# Patient Record
Sex: FEMALE | Race: WHITE
[De-identification: names, ages, dates, MRNs, and addresses within clinical notes are randomized per-mention and may not be internally consistent; named-entity substitution may affect disease eponyms.]

---

## 2018-11-26 ENCOUNTER — HOSPITAL ENCOUNTER (EMERGENCY)
Dept: HOSPITAL 47 - EC | Age: 23
Discharge: HOME | End: 2018-11-26
Payer: COMMERCIAL

## 2018-11-26 VITALS — RESPIRATION RATE: 18 BRPM

## 2018-11-26 VITALS — TEMPERATURE: 99 F | SYSTOLIC BLOOD PRESSURE: 117 MMHG | DIASTOLIC BLOOD PRESSURE: 62 MMHG | HEART RATE: 87 BPM

## 2018-11-26 DIAGNOSIS — R05: ICD-10-CM

## 2018-11-26 DIAGNOSIS — F17.200: ICD-10-CM

## 2018-11-26 DIAGNOSIS — Z32.02: ICD-10-CM

## 2018-11-26 DIAGNOSIS — R07.0: ICD-10-CM

## 2018-11-26 DIAGNOSIS — R11.10: Primary | ICD-10-CM

## 2018-11-26 DIAGNOSIS — R10.11: ICD-10-CM

## 2018-11-26 DIAGNOSIS — R09.89: ICD-10-CM

## 2018-11-26 LAB
PH UR: 7 [PH] (ref 5–8)
SP GR UR: 1.02 (ref 1–1.03)
UROBILINOGEN UR QL STRIP: <2 MG/DL (ref ?–2)

## 2018-11-26 PROCEDURE — 81003 URINALYSIS AUTO W/O SCOPE: CPT

## 2018-11-26 PROCEDURE — 87430 STREP A AG IA: CPT

## 2018-11-26 PROCEDURE — 87081 CULTURE SCREEN ONLY: CPT

## 2018-11-26 PROCEDURE — 99284 EMERGENCY DEPT VISIT MOD MDM: CPT

## 2018-11-26 PROCEDURE — 87502 INFLUENZA DNA AMP PROBE: CPT

## 2018-11-26 PROCEDURE — 81025 URINE PREGNANCY TEST: CPT

## 2018-11-26 PROCEDURE — 71046 X-RAY EXAM CHEST 2 VIEWS: CPT

## 2018-11-26 NOTE — ED
General Adult HPI





- General


Chief complaint: Nausea/Vomiting/Diarrhea


Stated complaint: Vomiting/cough


Time Seen by Provider: 11/26/18 16:14


Source: patient


Mode of arrival: ambulatory


Limitations: no limitations





- History of Present Illness


Initial comments: 


23-year-old female with no past medical history presenting today for multiple 

complaints. Pt state that she had one episode of vomiting this morning 

randomly. She states she did have a mild stomach ache (RUQ) for the past day or 

so. Pt denies fever, chills, hematemesis, melena, hematochezia. Bowel movement 

normal, pt denies constipation. Patient did say she was concerned for pregnancy 

because she is on protective sex.  In addition patient states she has had cough 

for the past 3 months, denies it being productive.   She states this she states 

that she often gets cough and congestion with the change of seasons.  Patient 

denies sinus pressure.  Patient denies any chest pain, shortness of breath or 

dyspnea on exertion. Pt states she missed work this morning and is just here 

for a work note, she states she does not want blood drawn. Remainder of ROS (-)

, patient denies any recent back pain, abdominal pain, numbness or tingling, 

dysuria or hematuria, constipation or diarrhea, headaches or visual changes, or 

any other complaints. Upon arrival VS stable pt is well appearing, eating candy 

in room.








- Related Data


 Previous Rx's











 Medication  Instructions  Recorded


 


Loratadine [Claritin] 10 mg PO DAILY 7 Days #7 tab 11/26/18











 Allergies











Allergy/AdvReac Type Severity Reaction Status Date / Time


 


No Known Allergies Allergy   Verified 11/26/18 15:50














Review of Systems


ROS Statement: 


Those systems with pertinent positive or pertinent negative responses have been 

documented in the HPI.





ROS Other: All systems not noted in ROS Statement are negative.


Constitutional: Denies: fever, chills, night sweats


ENT: Reports: throat pain (pt admits to throat burn after vomiting, no blood).  

Denies: ear pain


Respiratory: Denies: cough, wheezes, hemoptysis, stridor


Cardiovascular: Denies: chest pain, palpitations, dyspnea on exertion


Endocrine: Denies: fatigue


Gastrointestinal: Reports: abdominal pain, nausea, vomiting.  Denies: diarrhea, 

constipation, hematemesis, melena, hematochezia


Genitourinary: Denies: urgency, dysuria, frequency, hematuria, discharge


Musculoskeletal: Denies: back pain


Skin: Denies: rash, lesions


Neurological: Denies: headache, weakness, numbness, paresthesias, confusion





Past Medical History


Past Medical History: No Reported History


History of Any Multi-Drug Resistant Organisms: None Reported


Past Surgical History: No Surgical Hx Reported


Past Psychological History: No Psychological Hx Reported


Smoking Status: Current every day smoker


Past Alcohol Use History: None Reported


Past Drug Use History: None Reported





General Exam





- General Exam Comments


Initial Comments: 


General:  The patient is awake and alert, in no distress, and does not appear 

acutely ill. 


Eye:  Pupils are equal, round and reactive to light, extra-ocular movements are 

intact.  No nystagmus.  There is normal conjunctiva bilaterally.  No signs of 

icterus.  


Ears, nose, mouth and throat:  There are moist mucous membranes and no oral 

lesions.  Oropharynx is nonerythematous, there is no tonsillar enlargement or 

exudates.  No anterior cervical lymph node feet.  No pain to palpation of the 

frontal and maxillary sinuses.  Tympanic membranes within normal limits 

bilaterally. 


Neck:  The neck is supple, there is no tenderness or JVD.  


Cardiovascular:  There is a regular rate and rhythm. No murmur, rub or gallop 

is appreciated.


Respiratory:  Lungs are clear to auscultation, respirations are non-labored, 

breath sounds are equal.  No wheezes, stridor, rales, or rhonchi.


Gastrointestinal:  No noted diaphoresis, jaundice, pallor, protecting postures 

or squirming.


Symmetrical pigmentation of abdomen without signs of inflammation, scars, or 

striae. Umbilicus mildline, inverted without swelling. No dilated veins. No 

noted abdominal distention, no rigidity or guarding. No visible masses. No 

peristalsis, aortic pulsations, or ventral hernia. Bowel sounds audible in all 

4 quadrants, unremarkable.  No friction rubs or venous hums. No epigastic, 

hepatic or abdominal bruits. No tenderness to light or deep palpation. Liver 

edge, not palpable. Spleen edge, right and left kidney not palpable. Superior 

bladder margin non-tender. 


Special Testing:


Negative  [place], Rovsing, McBurney, [place], cutaneous hyperesthesia. 

Iliopsoas and obturator tests negative bilaterally. Negative Heel Jar test. No 

CVA tenderness. Digital rectal exam deferred. Negative grey turners or cullens 

sign


Musculoskeletal:  Normal ROM, no tenderness.  Strength 5/5. Sensation intact. 

Pulses equal bilaterally 2+.  


Neurological:  A&O x 3. CN II-XII intact, There are no obvious motor or sensory 

deficits. Coordination appears grossly intact. Speech is normal.


Skin:  Skin is warm and dry and no rashes or lesions are noted. 


Psychiatric:  Cooperative, appropriate mood & affect, normal judgment.  





Limitations: no limitations





Course


 Vital Signs











  11/26/18





  15:46


 


Temperature 98.5 F


 


Pulse Rate 77


 


Respiratory 18





Rate 


 


Blood Pressure 95/67


 


O2 Sat by Pulse 99





Oximetry 














Medical Decision Making





- Medical Decision Making


Vital signs within acceptable limits.  Strep testing negative, chest x-ray 

negative.  UA unremarkable.  HCG negative.  Patient denied medication stating 

she only needs work note.  Patient denies blood draw.  Abdominal exam benign, 

no suspicion for acute abdomen at this time. Tolerating PO intake in room. 

Patient did admit to a burning sensation throat, denies any shortness of breath

, feeling of throat closing, swelling of the neck.  I feel this may be 

consistent with recent emesis. At this time I feel pt is stable for discharge 

with short return parameters for any worsening abdominal pain.  Pt states she 

has chronic allergies, I started patient on claritin. Patient verbalized 

agreement with plan.  I discussed the case with Dr. Ferrara who agreed with 

impression and plan. Pt discharged in stable condition. Pt agreeable with 

discharge stating, " she just needs a work note". 








- Lab Data


 Lab Results











  11/26/18 11/26/18 11/26/18 Range/Units





  16:20 16:20 16:50 


 


Urine Color  Yellow    


 


Urine Appearance  Clear    (Clear)  


 


Urine pH  7.0    (5.0-8.0)  


 


Ur Specific Gravity  1.019    (1.001-1.035)  


 


Urine Protein  Negative    (Negative)  


 


Urine Glucose (UA)  Negative    (Negative)  


 


Urine Ketones  Negative    (Negative)  


 


Urine Blood  Negative    (Negative)  


 


Urine Nitrite  Negative    (Negative)  


 


Urine Bilirubin  Negative    (Negative)  


 


Urine Urobilinogen  <2.0    (<2.0)  mg/dL


 


Ur Leukocyte Esterase  Negative    (Negative)  


 


Urine HCG, Qual   Not Detected   (Not Detectd)  


 


Influenza Type A RNA    Not Detected  (Not Detectd)  


 


Influenza Type B (PCR)    Not Detected  (Not Detectd)  


 


Group A Strep Rapid     (Negative)  














  11/26/18 Range/Units





  16:50 


 


Urine Color   


 


Urine Appearance   (Clear)  


 


Urine pH   (5.0-8.0)  


 


Ur Specific Gravity   (1.001-1.035)  


 


Urine Protein   (Negative)  


 


Urine Glucose (UA)   (Negative)  


 


Urine Ketones   (Negative)  


 


Urine Blood   (Negative)  


 


Urine Nitrite   (Negative)  


 


Urine Bilirubin   (Negative)  


 


Urine Urobilinogen   (<2.0)  mg/dL


 


Ur Leukocyte Esterase   (Negative)  


 


Urine HCG, Qual   (Not Detectd)  


 


Influenza Type A RNA   (Not Detectd)  


 


Influenza Type B (PCR)   (Not Detectd)  


 


Group A Strep Rapid  Negative  (Negative)  














Disposition


Clinical Impression: 


 Vomiting, Chronic cough





Disposition: HOME SELF-CARE


Condition: Good


Instructions:  Acute Nausea and Vomiting (ED)


Additional Instructions: 


Please use medication as discussed.  Please follow-up with family doctor in the 

next 2 days..  Please return to emergency room if the symptoms increase or 

worsen or for any other concerns, as discussed.


Prescriptions: 


Loratadine [Claritin] 10 mg PO DAILY 7 Days #7 tab


Is patient prescribed a controlled substance at d/c from ED?: No


Referrals: 


Anton Chavez MD [Primary Care Provider] - 1-2 days


Time of Disposition: 18:41

## 2018-11-26 NOTE — XR
EXAMINATION TYPE: XR chest 2V

 

DATE OF EXAM: 11/26/2018

 

COMPARISON: NONE

 

HISTORY: Cough and sore throat

 

TECHNIQUE:  Frontal and lateral views of the chest are obtained.

 

FINDINGS:  Heart and mediastinum are normal. Lungs are clear. Diaphragm is normal. Bony thorax appear
s normal. Pulmonary vascularity is normal.

 

IMPRESSION:  Normal chest.

## 2019-01-01 ENCOUNTER — HOSPITAL ENCOUNTER (EMERGENCY)
Dept: HOSPITAL 47 - EC | Age: 24
Discharge: HOME | End: 2019-01-01
Payer: COMMERCIAL

## 2019-01-01 VITALS — RESPIRATION RATE: 16 BRPM | DIASTOLIC BLOOD PRESSURE: 63 MMHG | SYSTOLIC BLOOD PRESSURE: 108 MMHG | HEART RATE: 73 BPM

## 2019-01-01 VITALS — TEMPERATURE: 98.3 F

## 2019-01-01 DIAGNOSIS — Z3A.08: ICD-10-CM

## 2019-01-01 DIAGNOSIS — O21.9: Primary | ICD-10-CM

## 2019-01-01 DIAGNOSIS — O23.41: ICD-10-CM

## 2019-01-01 DIAGNOSIS — R10.31: ICD-10-CM

## 2019-01-01 DIAGNOSIS — F17.200: ICD-10-CM

## 2019-01-01 DIAGNOSIS — O99.89: ICD-10-CM

## 2019-01-01 DIAGNOSIS — O99.331: ICD-10-CM

## 2019-01-01 LAB
ALBUMIN SERPL-MCNC: 4.7 G/DL (ref 3.5–5)
ALP SERPL-CCNC: 53 U/L (ref 38–126)
ALT SERPL-CCNC: 76 U/L (ref 9–52)
AMYLASE SERPL-CCNC: 56 U/L (ref 30–110)
ANION GAP SERPL CALC-SCNC: 13 MMOL/L
AST SERPL-CCNC: 52 U/L (ref 14–36)
BASOPHILS # BLD AUTO: 0 K/UL (ref 0–0.2)
BASOPHILS NFR BLD AUTO: 0 %
BUN SERPL-SCNC: 13 MG/DL (ref 7–17)
CALCIUM SPEC-MCNC: 9.9 MG/DL (ref 8.4–10.2)
CHLORIDE SERPL-SCNC: 102 MMOL/L (ref 98–107)
CO2 SERPL-SCNC: 24 MMOL/L (ref 22–30)
EOSINOPHIL # BLD AUTO: 0.1 K/UL (ref 0–0.7)
EOSINOPHIL NFR BLD AUTO: 1 %
ERYTHROCYTE [DISTWIDTH] IN BLOOD BY AUTOMATED COUNT: 5.16 M/UL (ref 3.8–5.4)
ERYTHROCYTE [DISTWIDTH] IN BLOOD: 12.5 % (ref 11.5–15.5)
GLUCOSE SERPL-MCNC: 90 MG/DL (ref 74–99)
HCT VFR BLD AUTO: 44.2 % (ref 34–46)
HGB BLD-MCNC: 15 GM/DL (ref 11.4–16)
LIPASE SERPL-CCNC: 101 U/L (ref 23–300)
LYMPHOCYTES # SPEC AUTO: 1.8 K/UL (ref 1–4.8)
LYMPHOCYTES NFR SPEC AUTO: 18 %
MCH RBC QN AUTO: 29.1 PG (ref 25–35)
MCHC RBC AUTO-ENTMCNC: 34 G/DL (ref 31–37)
MCV RBC AUTO: 85.6 FL (ref 80–100)
MONOCYTES # BLD AUTO: 0.6 K/UL (ref 0–1)
MONOCYTES NFR BLD AUTO: 5 %
NEUTROPHILS # BLD AUTO: 7.7 K/UL (ref 1.3–7.7)
NEUTROPHILS NFR BLD AUTO: 74 %
PH UR: 6.5 [PH] (ref 5–8)
PLATELET # BLD AUTO: 206 K/UL (ref 150–450)
POTASSIUM SERPL-SCNC: 4 MMOL/L (ref 3.5–5.1)
PROT SERPL-MCNC: 7.8 G/DL (ref 6.3–8.2)
PROT UR QL: (no result)
RBC UR QL: 18 /HPF (ref 0–5)
SODIUM SERPL-SCNC: 139 MMOL/L (ref 137–145)
SP GR UR: 1.03 (ref 1–1.03)
SQUAMOUS UR QL AUTO: 85 /HPF (ref 0–4)
UROBILINOGEN UR QL STRIP: 6 MG/DL (ref ?–2)
WBC # BLD AUTO: 10.3 K/UL (ref 3.8–10.6)

## 2019-01-01 PROCEDURE — 96375 TX/PRO/DX INJ NEW DRUG ADDON: CPT

## 2019-01-01 PROCEDURE — 80053 COMPREHEN METABOLIC PANEL: CPT

## 2019-01-01 PROCEDURE — 86900 BLOOD TYPING SEROLOGIC ABO: CPT

## 2019-01-01 PROCEDURE — 87086 URINE CULTURE/COLONY COUNT: CPT

## 2019-01-01 PROCEDURE — 36415 COLL VENOUS BLD VENIPUNCTURE: CPT

## 2019-01-01 PROCEDURE — 84702 CHORIONIC GONADOTROPIN TEST: CPT

## 2019-01-01 PROCEDURE — 86901 BLOOD TYPING SEROLOGIC RH(D): CPT

## 2019-01-01 PROCEDURE — 76801 OB US < 14 WKS SINGLE FETUS: CPT

## 2019-01-01 PROCEDURE — 96361 HYDRATE IV INFUSION ADD-ON: CPT

## 2019-01-01 PROCEDURE — 99284 EMERGENCY DEPT VISIT MOD MDM: CPT

## 2019-01-01 PROCEDURE — 83690 ASSAY OF LIPASE: CPT

## 2019-01-01 PROCEDURE — 81001 URINALYSIS AUTO W/SCOPE: CPT

## 2019-01-01 PROCEDURE — 96374 THER/PROPH/DIAG INJ IV PUSH: CPT

## 2019-01-01 PROCEDURE — 85025 COMPLETE CBC W/AUTO DIFF WBC: CPT

## 2019-01-01 PROCEDURE — 82150 ASSAY OF AMYLASE: CPT

## 2019-01-01 PROCEDURE — 76705 ECHO EXAM OF ABDOMEN: CPT

## 2019-01-01 NOTE — ED
Nausea/Vomiting/Diarrhea HPI





- General


Chief complaint: Nausea/Vomiting/Diarrhea


Stated complaint: 8wks pregnant/not able to keep anything down


Time Seen by Provider: 01/01/19 18:03


Source: patient, RN notes reviewed, old records reviewed


Mode of arrival: ambulatory


Limitations: no limitations





- History of Present Illness


Initial comments: 


Exactly Patient is a 23-year-old female, 8 weeks pregnant, presents emergency 

department today with chief complaint of nausea.  Patient states that she's 

been having significant nausea since she are not she was pregnant a few weeks 

ago.  She had an ultrasound 2 weeks ago to confirm intrauterine pregnancy.  

Patient states that she's had no vaginal bleeding or discharge.  Today she 

complains of some mild right lower quadrant pain.  Patient states that she's 

been having this right lower quadrant pain for over a week.  She states that 

she isn't having a difficult time keeping anything down.  After one bite of 

food she throws up.  She denies any chest pain, shortness of breath.  This is 

patient's first pregnancy.  Patient states she has not had chance to follow-up 

with OB/GYN as of this time.








- Related Data


 Previous Rx's











 Medication  Instructions  Recorded


 


Cephalexin [Keflex] 500 mg PO Q8HR #21 cap 01/01/19


 


Metoclopramide [Reglan] 10 mg PO TID #12 tab 01/01/19











 Allergies











Allergy/AdvReac Type Severity Reaction Status Date / Time


 


No Known Allergies Allergy   Verified 01/01/19 18:13














Review of Systems


ROS Statement: 


Those systems with pertinent positive or pertinent negative responses have been 

documented in the HPI.





ROS Other: All systems not noted in ROS Statement are negative.





Past Medical History


Past Medical History: No Reported History


History of Any Multi-Drug Resistant Organisms: None Reported


Past Surgical History: No Surgical Hx Reported


Past Psychological History: No Psychological Hx Reported


Smoking Status: Current every day smoker


Past Alcohol Use History: None Reported


Past Drug Use History: None Reported





General Exam





- General Exam Comments


Initial Comments: 





This is a 23-year-old female.  Alert and oriented.  Patient appears in acute 

distress.


Limitations: no limitations


General appearance: alert, in no apparent distress


Head exam: Present: atraumatic, normocephalic, normal inspection


Eye exam: Present: normal appearance, PERRL, EOMI.  Absent: scleral icterus, 

conjunctival injection, periorbital swelling


ENT exam: Present: normal exam, mucous membranes moist


Neck exam: Present: normal inspection.  Absent: tenderness, meningismus, 

lymphadenopathy


Respiratory exam: Present: normal lung sounds bilaterally.  Absent: respiratory 

distress, wheezes, rales, rhonchi, stridor


Cardiovascular Exam: Present: regular rate, normal rhythm, normal heart sounds.

  Absent: systolic murmur, diastolic murmur, rubs, gallop, clicks


GI/Abdominal exam: Present: soft, tenderness (minimal RLQ tenderness), normal 

bowel sounds.  Absent: distended, guarding, rebound, rigid


Extremities exam: Present: normal inspection, full ROM, normal capillary 

refill.  Absent: tenderness, pedal edema, joint swelling, calf tenderness


Back exam: Present: normal inspection


Neurological exam: Present: alert, oriented X3, CN II-XII intact


Psychiatric exam: Present: normal affect, normal mood


Skin exam: Present: warm, dry, intact, normal color.  Absent: rash





Course


 Vital Signs











  01/01/19





  17:53


 


Temperature 98.3 F


 


Pulse Rate 108 H


 


Respiratory 20





Rate 


 


Blood Pressure 117/71


 


O2 Sat by Pulse 100





Oximetry 














Medical Decision Making





- Medical Decision Making





23-year-old female presents emergency department today with concerns of nausea 

during initial pregnancy.  She also complains of right-sided abdominal pain.  

No significant tenderness.  Patient is given IV fluids labwork obtained.  Given 

Reglan and Benadryl.  She has had improvement of her nausea symptoms at this 

time.  Patient at this time has a normal white count.  Normal vital signs.  

Urinalysis is positive for signs of infection.  She adamantly denies vaginal 

bleeding did not declines doing a pelvic exam.  She is Rh+ blood type.  

Cirrhosis I will put the Patient on antibiotics to cover for urinary tract 

infection.  Discussed close follow-up with OB/GYN.  She does not have an OB/GYN 

at this time.  Given referrals started to establish care. 





- Lab Data


Result diagrams: 


 01/01/19 18:47





 01/01/19 18:47


 Lab Results











  01/01/19 01/01/19 01/01/19 Range/Units





  18:35 18:47 18:47 


 


WBC     (3.8-10.6)  k/uL


 


RBC     (3.80-5.40)  m/uL


 


Hgb     (11.4-16.0)  gm/dL


 


Hct     (34.0-46.0)  %


 


MCV     (80.0-100.0)  fL


 


MCH     (25.0-35.0)  pg


 


MCHC     (31.0-37.0)  g/dL


 


RDW     (11.5-15.5)  %


 


Plt Count     (150-450)  k/uL


 


Neutrophils %     %


 


Lymphocytes %     %


 


Monocytes %     %


 


Eosinophils %     %


 


Basophils %     %


 


Neutrophils #     (1.3-7.7)  k/uL


 


Lymphocytes #     (1.0-4.8)  k/uL


 


Monocytes #     (0-1.0)  k/uL


 


Eosinophils #     (0-0.7)  k/uL


 


Basophils #     (0-0.2)  k/uL


 


Sodium    139  (137-145)  mmol/L


 


Potassium    4.0  (3.5-5.1)  mmol/L


 


Chloride    102  ()  mmol/L


 


Carbon Dioxide    24  (22-30)  mmol/L


 


Anion Gap    13  mmol/L


 


BUN    13  (7-17)  mg/dL


 


Creatinine    0.56  (0.52-1.04)  mg/dL


 


Est GFR (CKD-EPI)AfAm    >90  (>60 ml/min/1.73 sqM)  


 


Est GFR (CKD-EPI)NonAf    >90  (>60 ml/min/1.73 sqM)  


 


Glucose    90  (74-99)  mg/dL


 


Calcium    9.9  (8.4-10.2)  mg/dL


 


Total Bilirubin    0.4  (0.2-1.3)  mg/dL


 


AST    52 H  (14-36)  U/L


 


ALT    76 H  (9-52)  U/L


 


Alkaline Phosphatase    53  ()  U/L


 


Total Protein    7.8  (6.3-8.2)  g/dL


 


Albumin    4.7  (3.5-5.0)  g/dL


 


Amylase    56  ()  U/L


 


Lipase    101  ()  U/L


 


Urine Color  Yellow    


 


Urine Appearance  Turbid H    (Clear)  


 


Urine pH  6.5    (5.0-8.0)  


 


Ur Specific Gravity  1.027    (1.001-1.035)  


 


Urine Protein  1+ H    (Negative)  


 


Urine Glucose (UA)  Trace H    (Negative)  


 


Urine Ketones  Trace H    (Negative)  


 


Urine Blood  Negative    (Negative)  


 


Urine Nitrite  Negative    (Negative)  


 


Urine Bilirubin  Negative    (Negative)  


 


Urine Urobilinogen  6.0    (<2.0)  mg/dL


 


Ur Leukocyte Esterase  Large H    (Negative)  


 


Urine RBC  18 H    (0-5)  /hpf


 


Urine WBC  9 H    (0-5)  /hpf


 


Ur Squamous Epith Cells  85 H    (0-4)  /hpf


 


Urine Bacteria  Moderate H    (None)  /hpf


 


Urine Mucus  Many H    (None)  /hpf


 


Blood Type   O Positive   


 


Blood Type Recheck   [facility] ONLY   














  01/01/19 Range/Units





  18:47 


 


WBC  10.3  (3.8-10.6)  k/uL


 


RBC  5.16  (3.80-5.40)  m/uL


 


Hgb  15.0  (11.4-16.0)  gm/dL


 


Hct  44.2  (34.0-46.0)  %


 


MCV  85.6  (80.0-100.0)  fL


 


MCH  29.1  (25.0-35.0)  pg


 


MCHC  34.0  (31.0-37.0)  g/dL


 


RDW  12.5  (11.5-15.5)  %


 


Plt Count  206  (150-450)  k/uL


 


Neutrophils %  74  %


 


Lymphocytes %  18  %


 


Monocytes %  5  %


 


Eosinophils %  1  %


 


Basophils %  0  %


 


Neutrophils #  7.7  (1.3-7.7)  k/uL


 


Lymphocytes #  1.8  (1.0-4.8)  k/uL


 


Monocytes #  0.6  (0-1.0)  k/uL


 


Eosinophils #  0.1  (0-0.7)  k/uL


 


Basophils #  0.0  (0-0.2)  k/uL


 


Sodium   (137-145)  mmol/L


 


Potassium   (3.5-5.1)  mmol/L


 


Chloride   ()  mmol/L


 


Carbon Dioxide   (22-30)  mmol/L


 


Anion Gap   mmol/L


 


BUN   (7-17)  mg/dL


 


Creatinine   (0.52-1.04)  mg/dL


 


Est GFR (CKD-EPI)AfAm   (>60 ml/min/1.73 sqM)  


 


Est GFR (CKD-EPI)NonAf   (>60 ml/min/1.73 sqM)  


 


Glucose   (74-99)  mg/dL


 


Calcium   (8.4-10.2)  mg/dL


 


Total Bilirubin   (0.2-1.3)  mg/dL


 


AST   (14-36)  U/L


 


ALT   (9-52)  U/L


 


Alkaline Phosphatase   ()  U/L


 


Total Protein   (6.3-8.2)  g/dL


 


Albumin   (3.5-5.0)  g/dL


 


Amylase   ()  U/L


 


Lipase   ()  U/L


 


Urine Color   


 


Urine Appearance   (Clear)  


 


Urine pH   (5.0-8.0)  


 


Ur Specific Gravity   (1.001-1.035)  


 


Urine Protein   (Negative)  


 


Urine Glucose (UA)   (Negative)  


 


Urine Ketones   (Negative)  


 


Urine Blood   (Negative)  


 


Urine Nitrite   (Negative)  


 


Urine Bilirubin   (Negative)  


 


Urine Urobilinogen   (<2.0)  mg/dL


 


Ur Leukocyte Esterase   (Negative)  


 


Urine RBC   (0-5)  /hpf


 


Urine WBC   (0-5)  /hpf


 


Ur Squamous Epith Cells   (0-4)  /hpf


 


Urine Bacteria   (None)  /hpf


 


Urine Mucus   (None)  /hpf


 


Blood Type   


 


Blood Type Recheck   














- Radiology Data


Radiology results: report reviewed


Fetal ultrasound shows viable IUP heart rate 1 72 bpm.  Measuring 9 weeks and 0 

days.  Ultrasound of the appendix was negative for any inflammatory changes.





Disposition


Clinical Impression: 


 Nausea/vomiting in pregnancy, UTI in pregnancy





Disposition: HOME SELF-CARE


Condition: Good


Instructions:  Urinary Tract Infection in Pregnancy (ED)


Additional Instructions: 


Patient advised to  follow-up with OB/GYN.  Return to emergency department if 

any alarming signs or symptoms occur.  Take medications as prescribed.  Rest, 

remain hydrated.


Prescriptions: 


Cephalexin [Keflex] 500 mg PO Q8HR #21 cap


Metoclopramide [Reglan] 10 mg PO TID #12 tab


Is patient prescribed a controlled substance at d/c from ED?: No


Referrals: 


Anton Chavez MD [Primary Care Provider] - 1-2 days


Time of Disposition: 21:08

## 2019-01-01 NOTE — US
EXAMINATION TYPE: US abdomen APPY

 

DATE OF EXAM: 1/1/2019

 

COMPARISON: NONE

 

CLINICAL HISTORY: Pain. RLQ Pain

 

APPENDIX

AP Diameter (normal < 6mm):  3 mm

     Measured outer wall to outer wall.

 

Is the appendix seen in its entirety from the proximal cecum to distal end:  Yes 

Is the appendix compressible:  no 

Does the appendix wall appear hypervascular:  no 

Is an appendicolith present:  no 

Is there inflammatory changes or free fluid present:  no 

 

Appendix not seen in its entirety.

 

 

 

IMPRESSION:  Appendix is partly seen and appears normal. No free fluid.

## 2019-01-01 NOTE — US
EXAMINATION TYPE: Transabdominal

 

DATE OF EXAM: 4/3/18

 

COMPARISON: NONE

 

CLINICAL HISTORY: Pain. RLQ pain

 

EXAM PERFORMED:  Transabdominal (TA)

 

EXAM MEASUREMENTS:

 

GESTATIONAL AGE / DATING

 

Physician Established: (8 weeks/4 days) 

** EDC:  08/09/2019

Dates by LMP: (8 weeks/4 days)  

** EDC: 08/09/2019

Dates by First Scan:  No previous this is first scan 

Dates by Current Scan for: (9 weeks/0 days)  

** EDC: 08/06/2019

 

MATERNAL ANATOMY 

 

Uterus: 12.6 x 6.6 x 8.0 cm

Right Ovary: 3.0 x 2.9 x 2.5 cm

Left Ovary: 3.1 x 2.9 x 2.4 cm

Post CDS / Adnexa: wnl

Presence of free fluid: no

Presence of corpus luteal cyst: no

Presence of subchorionic bleed: no

 

GESTATION / FETAL SURVEY

 

CRL: 2.3cm (9 weeks/0 days)

Yolk Sac (normal less than 6mm): 4mm

Heart Rate: 172 bpm

Rhythm:  Normal

IUP:  Viable IUP

 

Beta HcG (if available): Not available at this time

 

Viable IUP 9w 0d  BPM

 

 

 

IMPRESSION:

No complicating process seen.

## 2019-01-11 ENCOUNTER — HOSPITAL ENCOUNTER (EMERGENCY)
Dept: HOSPITAL 47 - EC | Age: 24
Discharge: HOME | End: 2019-01-11
Payer: COMMERCIAL

## 2019-01-11 VITALS — SYSTOLIC BLOOD PRESSURE: 103 MMHG | TEMPERATURE: 98.5 F | HEART RATE: 62 BPM | DIASTOLIC BLOOD PRESSURE: 74 MMHG

## 2019-01-11 VITALS — RESPIRATION RATE: 16 BRPM

## 2019-01-11 DIAGNOSIS — O99.89: ICD-10-CM

## 2019-01-11 DIAGNOSIS — F17.200: ICD-10-CM

## 2019-01-11 DIAGNOSIS — R42: ICD-10-CM

## 2019-01-11 DIAGNOSIS — Z53.8: ICD-10-CM

## 2019-01-11 DIAGNOSIS — O99.331: ICD-10-CM

## 2019-01-11 DIAGNOSIS — Z3A.10: ICD-10-CM

## 2019-01-11 DIAGNOSIS — O21.9: Primary | ICD-10-CM

## 2019-01-11 DIAGNOSIS — R00.0: ICD-10-CM

## 2019-01-11 LAB
ALBUMIN SERPL-MCNC: 5.1 G/DL (ref 3.5–5)
ALP SERPL-CCNC: 43 U/L (ref 38–126)
ALT SERPL-CCNC: 38 U/L (ref 9–52)
AMYLASE SERPL-CCNC: 65 U/L (ref 30–110)
ANION GAP SERPL CALC-SCNC: 13 MMOL/L
AST SERPL-CCNC: 29 U/L (ref 14–36)
BASOPHILS # BLD AUTO: 0 K/UL (ref 0–0.2)
BASOPHILS NFR BLD AUTO: 0 %
BUN SERPL-SCNC: 12 MG/DL (ref 7–17)
CALCIUM SPEC-MCNC: 10.4 MG/DL (ref 8.4–10.2)
CHLORIDE SERPL-SCNC: 103 MMOL/L (ref 98–107)
CO2 SERPL-SCNC: 24 MMOL/L (ref 22–30)
EOSINOPHIL # BLD AUTO: 0.1 K/UL (ref 0–0.7)
EOSINOPHIL NFR BLD AUTO: 1 %
ERYTHROCYTE [DISTWIDTH] IN BLOOD BY AUTOMATED COUNT: 5.2 M/UL (ref 3.8–5.4)
ERYTHROCYTE [DISTWIDTH] IN BLOOD: 12.7 % (ref 11.5–15.5)
GLUCOSE SERPL-MCNC: 121 MG/DL (ref 74–99)
HCT VFR BLD AUTO: 45.1 % (ref 34–46)
HGB BLD-MCNC: 15.1 GM/DL (ref 11.4–16)
KETONES UR QL STRIP.AUTO: (no result)
LIPASE SERPL-CCNC: 87 U/L (ref 23–300)
LYMPHOCYTES # SPEC AUTO: 1.7 K/UL (ref 1–4.8)
LYMPHOCYTES NFR SPEC AUTO: 17 %
MCH RBC QN AUTO: 28.9 PG (ref 25–35)
MCHC RBC AUTO-ENTMCNC: 33.4 G/DL (ref 31–37)
MCV RBC AUTO: 86.7 FL (ref 80–100)
MONOCYTES # BLD AUTO: 0.4 K/UL (ref 0–1)
MONOCYTES NFR BLD AUTO: 4 %
NEUTROPHILS # BLD AUTO: 7.5 K/UL (ref 1.3–7.7)
NEUTROPHILS NFR BLD AUTO: 77 %
PH UR: 5.5 [PH] (ref 5–8)
PLATELET # BLD AUTO: 202 K/UL (ref 150–450)
POTASSIUM SERPL-SCNC: 4 MMOL/L (ref 3.5–5.1)
PROT SERPL-MCNC: 8.4 G/DL (ref 6.3–8.2)
RBC UR QL: 3 /HPF (ref 0–5)
SODIUM SERPL-SCNC: 140 MMOL/L (ref 137–145)
SP GR UR: 1.02 (ref 1–1.03)
SQUAMOUS UR QL AUTO: 13 /HPF (ref 0–4)
UROBILINOGEN UR QL STRIP: 3 MG/DL (ref ?–2)
WBC # BLD AUTO: 9.8 K/UL (ref 3.8–10.6)
WBC #/AREA URNS HPF: 27 /HPF (ref 0–5)

## 2019-01-11 PROCEDURE — 36415 COLL VENOUS BLD VENIPUNCTURE: CPT

## 2019-01-11 PROCEDURE — 80053 COMPREHEN METABOLIC PANEL: CPT

## 2019-01-11 PROCEDURE — 83690 ASSAY OF LIPASE: CPT

## 2019-01-11 PROCEDURE — 82150 ASSAY OF AMYLASE: CPT

## 2019-01-11 PROCEDURE — 85025 COMPLETE CBC W/AUTO DIFF WBC: CPT

## 2019-01-11 PROCEDURE — 96361 HYDRATE IV INFUSION ADD-ON: CPT

## 2019-01-11 PROCEDURE — 96374 THER/PROPH/DIAG INJ IV PUSH: CPT

## 2019-01-11 PROCEDURE — 96375 TX/PRO/DX INJ NEW DRUG ADDON: CPT

## 2019-01-11 PROCEDURE — 99284 EMERGENCY DEPT VISIT MOD MDM: CPT

## 2019-01-11 PROCEDURE — 81001 URINALYSIS AUTO W/SCOPE: CPT

## 2019-01-11 NOTE — ED
Nausea/Vomiting/Diarrhea HPI





- General


Chief complaint: Nausea/Vomiting/Diarrhea


Stated complaint: 10 wks preg/vomiting


Time Seen by Provider: 19 17:21


Source: family


Mode of arrival: ambulatory


Limitations: no limitations





- History of Present Illness


Initial comments: 


23-year-old female patient presents to the emergency department today for 

evaluation of nausea and vomiting.  Patient states she is 10 weeks pregnant and 

has been having consistent nausea and vomiting on a daily basis since the 

beginning of January.  Patient states that she was seen and evaluated here on 

the first, was given Reglan.  States she is unable to keep down the pill.  

Patient states she is barely 1 pill per day and is unable to keep down any food 

or fluids.  Patient states she started to feel she is having racing heart and 

some lightheadedness.  States that her emesis is mostly bilious and frothy.  

She denies any hematemesis.  Denies any diarrhea, hematochezia, or melena.  

Denies any fevers or chills with this.  She is  and has an appointment 

with her OB/GYN Dr. Starkey on .  States that she has had 2 ultrasounds 

confirming intrauterine pregnancy.  She denies any current abdominal pain, 

vaginal bleeding, or vaginal discharge. Patient denies any recent rash, 

shortness breath, chest pain,  back pain, numbness, tingling, dizziness, 

weakness, hematuria, dysuria, urinary urgency, urinary frequency, headache, 

visual changes, or any other complaints.





- Related Data


 Previous Rx's











 Medication  Instructions  Recorded


 


Metoclopramide [Reglan] 10 mg PO TID #12 tab 19


 


Doxylamine/Pyridoxine HCl (B6) 1 tab PO HS #20 tab 19





[Venkatesh Meyers 10-10 mg Tablet]  


 


Famotidine [Pepcid] 20 mg PO DAILY #20 tablet 19


 


Metoclopramide [Reglan] 10 mg PO Q8H PRN #20 tab 19











 Allergies











Allergy/AdvReac Type Severity Reaction Status Date / Time


 


No Known Allergies Allergy   Verified 19 18:10














Review of Systems


ROS Statement: 


Those systems with pertinent positive or pertinent negative responses have been 

documented in the HPI.





ROS Other: All systems not noted in ROS Statement are negative.





Past Medical History


Past Medical History: No Reported History


History of Any Multi-Drug Resistant Organisms: None Reported


Past Surgical History: No Surgical Hx Reported


Past Psychological History: No Psychological Hx Reported


Smoking Status: Current every day smoker


Past Alcohol Use History: None Reported


Past Drug Use History: None Reported





General Exam


Limitations: no limitations


General appearance: alert, in no apparent distress, other (This is a well-

developed, well-nourished adult female patient in no acute distress.  Vital 

signs upon presentation are temperature 98.0F, pulse 91, respirations 18, 

blood pressure 108/73, pulse ox 98% on room air.)


Eye exam: Present: normal appearance, PERRL, EOMI.  Absent: scleral icterus, 

conjunctival injection, periorbital swelling


ENT exam: Present: normal exam, normal oropharynx, mucous membranes moist


Respiratory exam: Present: normal lung sounds bilaterally.  Absent: respiratory 

distress, wheezes, rales, rhonchi, stridor


Cardiovascular Exam: Present: regular rate, normal rhythm, normal heart sounds.

  Absent: systolic murmur, diastolic murmur, rubs, gallop, clicks


GI/Abdominal exam: Present: soft, normal bowel sounds.  Absent: distended, 

tenderness, guarding, rebound, rigid


Neurological exam: Present: alert, oriented X3, CN II-XII intact


Psychiatric exam: Present: normal affect, normal mood


Skin exam: Present: warm, dry, intact, normal color.  Absent: rash





Course


 Vital Signs











  19





  17:17 19:41 21:33


 


Temperature 98 F 98.0 F 98.5 F


 


Pulse Rate 91 66 62


 


Respiratory 18 16 16





Rate   


 


Blood Pressure 108/73 100/65 103/74


 


O2 Sat by Pulse 98 100 100





Oximetry   














Medical Decision Making





- Medical Decision Making


23-year-old female patient who is 10 weeks pregnant presents to the emergency 

department today for evaluation of persistent nausea and vomiting.  Patient 

reports being unable to keep down any food or fluids.  Physical examination is 

unremarkable.  She denies having any abdominal pain, vaginal bleeding, or 

vaginal discharge.  Labs reviewed and are relatively unremarkable.  Urine has 

been sent for culture.  Patient was given IV fluids and nausea medication here 

in the emergency department.  Upon reevaluation she does report feeling better.

  Did discuss diet modification and fluid intake.  She'll be given a 

prescription for Diclegis.  She is instructed to follow-up with OB/GYN for 

recheck as soon as possible.  Return parameters were discussed in detail.  She 

verbalizes understanding and agrees with this plan.








- Lab Data


Result diagrams: 


 19 18:00





 19 18:00


 Lab Results











  19 Range/Units





  18:00 18:00 18:00 


 


WBC  9.8    (3.8-10.6)  k/uL


 


RBC  5.20    (3.80-5.40)  m/uL


 


Hgb  15.1    (11.4-16.0)  gm/dL


 


Hct  45.1    (34.0-46.0)  %


 


MCV  86.7    (80.0-100.0)  fL


 


MCH  28.9    (25.0-35.0)  pg


 


MCHC  33.4    (31.0-37.0)  g/dL


 


RDW  12.7    (11.5-15.5)  %


 


Plt Count  202    (150-450)  k/uL


 


Neutrophils %  77    %


 


Lymphocytes %  17    %


 


Monocytes %  4    %


 


Eosinophils %  1    %


 


Basophils %  0    %


 


Neutrophils #  7.5    (1.3-7.7)  k/uL


 


Lymphocytes #  1.7    (1.0-4.8)  k/uL


 


Monocytes #  0.4    (0-1.0)  k/uL


 


Eosinophils #  0.1    (0-0.7)  k/uL


 


Basophils #  0.0    (0-0.2)  k/uL


 


Sodium   140   (137-145)  mmol/L


 


Potassium   4.0   (3.5-5.1)  mmol/L


 


Chloride   103   ()  mmol/L


 


Carbon Dioxide   24   (22-30)  mmol/L


 


Anion Gap   13   mmol/L


 


BUN   12   (7-17)  mg/dL


 


Creatinine   0.58   (0.52-1.04)  mg/dL


 


Est GFR (CKD-EPI)AfAm   >90   (>60 ml/min/1.73 sqM)  


 


Est GFR (CKD-EPI)NonAf   >90   (>60 ml/min/1.73 sqM)  


 


Glucose   121 H   (74-99)  mg/dL


 


Calcium   10.4 H   (8.4-10.2)  mg/dL


 


Total Bilirubin   0.6   (0.2-1.3)  mg/dL


 


AST   29   (14-36)  U/L


 


ALT   38   (9-52)  U/L


 


Alkaline Phosphatase   43   ()  U/L


 


Total Protein   8.4 H   (6.3-8.2)  g/dL


 


Albumin   5.1 H   (3.5-5.0)  g/dL


 


Amylase   65   ()  U/L


 


Lipase   87   ()  U/L


 


Urine Color    Yellow  


 


Urine Appearance    Cloudy H  (Clear)  


 


Urine pH    5.5  (5.0-8.0)  


 


Ur Specific Gravity    1.024  (1.001-1.035)  


 


Urine Protein    Trace H  (Negative)  


 


Urine Glucose (UA)    Negative  (Negative)  


 


Urine Ketones    1+ H  (Negative)  


 


Urine Blood    Negative  (Negative)  


 


Urine Nitrite    Negative  (Negative)  


 


Urine Bilirubin    Negative  (Negative)  


 


Urine Urobilinogen    3.0  (<2.0)  mg/dL


 


Ur Leukocyte Esterase    Small H  (Negative)  


 


Urine RBC    3  (0-5)  /hpf


 


Urine WBC    27 H  (0-5)  /hpf


 


Ur Squamous Epith Cells    13 H  (0-4)  /hpf


 


Amorphous Sediment    Occasional H  (None)  /hpf


 


Urine Bacteria    Occasional H  (None)  /hpf


 


Urine Mucus    Many H  (None)  /hpf














Disposition


Clinical Impression: 


 Vomiting during pregnancy





Disposition: HOME SELF-CARE


Condition: Good


Instructions:  Acute Nausea and Vomiting (ED)


Additional Instructions: 


Start with clear liquid diet and advance as tolerated. Take medication as 

directed. Return immediately for any new, worsening, or concerning symptoms. 


Prescriptions: 


Doxylamine/Pyridoxine HCl (B6) [Diclegis Dr 10-10 mg Tablet] 1 tab PO HS #20 tab


Famotidine [Pepcid] 20 mg PO DAILY #20 tablet


Metoclopramide [Reglan] 10 mg PO Q8H PRN #20 tab


 PRN Reason: Vomiting


Is patient prescribed a controlled substance at d/c from ED?: No


Referrals: 


Anton Chavez MD [Primary Care Provider] - 1-2 days

## 2019-04-10 ENCOUNTER — HOSPITAL ENCOUNTER (EMERGENCY)
Dept: HOSPITAL 47 - EC | Age: 24
Discharge: HOME | End: 2019-04-10
Payer: COMMERCIAL

## 2019-04-10 VITALS
RESPIRATION RATE: 16 BRPM | DIASTOLIC BLOOD PRESSURE: 68 MMHG | TEMPERATURE: 98.7 F | SYSTOLIC BLOOD PRESSURE: 104 MMHG | HEART RATE: 85 BPM

## 2019-04-10 DIAGNOSIS — O99.89: Primary | ICD-10-CM

## 2019-04-10 DIAGNOSIS — Z3A.23: ICD-10-CM

## 2019-04-10 DIAGNOSIS — R23.3: ICD-10-CM

## 2019-04-10 DIAGNOSIS — O99.332: ICD-10-CM

## 2019-04-10 DIAGNOSIS — O21.2: ICD-10-CM

## 2019-04-10 DIAGNOSIS — F17.200: ICD-10-CM

## 2019-04-10 PROCEDURE — 99282 EMERGENCY DEPT VISIT SF MDM: CPT

## 2019-04-10 NOTE — ED
Skin/Abscess/FB HPI





- General


Chief complaint: Skin/Abscess/Foreign Body


Stated complaint: Rash on face


Time Seen by Provider: 04/10/19 12:50


Source: patient, RN notes reviewed


Mode of arrival: ambulatory


Limitations: no limitations





- History of Present Illness


Initial comments: 





31-year-old female presents emergency Department chief complaint rash on her 

face.  Patient states she noticed small red dots on her face.  Patient states 

are not painful or itchy.  Patient does admit that she was vomiting last night 

profusely.  Patient states that has resolved she has no point to nausea vomiting

abdominal pain.  Patient is 23 weeks pregnant with no comp patients.  Patient 

states that she is concerned as she saw the rash.





- Related Data


                                  Previous Rx's











 Medication  Instructions  Recorded


 


Metoclopramide [Reglan] 10 mg PO TID #12 tab 01/01/19


 


Doxylamine/Pyridoxine HCl (B6) 1 tab PO HS #20 tab 01/11/19





[Diclegis Dr 10-10 mg Tablet]  


 


Famotidine [Pepcid] 20 mg PO DAILY #20 tablet 01/11/19


 


Metoclopramide [Reglan] 10 mg PO Q8H PRN #20 tab 01/11/19











                                    Allergies











Allergy/AdvReac Type Severity Reaction Status Date / Time


 


No Known Allergies Allergy   Verified 04/10/19 12:39














Review of Systems


ROS Statement: 


Those systems with pertinent positive or pertinent negative responses have been 

documented in the HPI.





ROS Other: All systems not noted in ROS Statement are negative.





Past Medical History


Past Medical History: No Reported History


History of Any Multi-Drug Resistant Organisms: None Reported


Past Surgical History: No Surgical Hx Reported


Past Psychological History: No Psychological Hx Reported


Smoking Status: Current every day smoker


Past Alcohol Use History: None Reported


Past Drug Use History: None Reported





General Exam


Limitations: no limitations


General appearance: alert, in no apparent distress


Head exam: Present: atraumatic, normocephalic, normal inspection


Eye exam: Present: normal appearance, PERRL, EOMI.  Absent: scleral icterus, 

conjunctival injection, periorbital swelling


ENT exam: Present: normal exam, normal oropharynx, mucous membranes moist, TM's 

normal bilaterally


Neck exam: Present: normal inspection, full ROM.  Absent: tenderness, 

meningismus, lymphadenopathy


Respiratory exam: Present: normal lung sounds bilaterally.  Absent: respiratory 

distress, wheezes, rales, rhonchi, stridor


Cardiovascular Exam: Present: regular rate, normal rhythm, normal heart sounds. 

 Absent: systolic murmur, diastolic murmur, rubs, gallop, clicks


Skin exam: Present: warm, dry, intact, normal color, rash (Small petechial 

erythematous rash in the face)





Course


                                   Vital Signs











  04/10/19





  12:37


 


Temperature 98.7 F


 


Pulse Rate 85


 


Respiratory 16





Rate 


 


Blood Pressure 104/68


 


O2 Sat by Pulse 99





Oximetry 














Medical Decision Making





- Medical Decision Making





23-year-old female presented for rash on her face.  Patient has broken blood 

vessels or petechia secondary to her emesis.  Patient has no other symptoms.  

Patient will be discharged return parameters discussed.





Disposition


Clinical Impression: 


 Petechial eruption





Disposition: HOME SELF-CARE


Condition: Stable


Instructions (If sedation given, give patient instructions):  Purpura (ED)


Additional Instructions: 


Please return to the Emergency Department if symptoms worsen or any other 

concerns.


Is patient prescribed a controlled substance at d/c from ED?: No


Referrals: 


None,Stated [Primary Care Provider] - 1-2 days

## 2019-08-02 ENCOUNTER — HOSPITAL ENCOUNTER (OUTPATIENT)
Dept: HOSPITAL 47 - FBPOP | Age: 24
Discharge: HOME | End: 2019-08-02
Attending: OBSTETRICS & GYNECOLOGY
Payer: COMMERCIAL

## 2019-08-02 VITALS
DIASTOLIC BLOOD PRESSURE: 74 MMHG | HEART RATE: 93 BPM | RESPIRATION RATE: 18 BRPM | SYSTOLIC BLOOD PRESSURE: 124 MMHG | TEMPERATURE: 96.8 F

## 2019-08-02 DIAGNOSIS — O36.8130: Primary | ICD-10-CM

## 2019-08-02 DIAGNOSIS — F17.200: ICD-10-CM

## 2019-08-02 DIAGNOSIS — Z3A.39: ICD-10-CM

## 2019-08-02 DIAGNOSIS — O99.333: ICD-10-CM

## 2019-08-02 PROCEDURE — 84112 EVAL AMNIOTIC FLUID PROTEIN: CPT

## 2019-08-02 PROCEDURE — 99213 OFFICE O/P EST LOW 20 MIN: CPT

## 2019-08-02 PROCEDURE — 59025 FETAL NON-STRESS TEST: CPT

## 2019-08-04 NOTE — P.MSEPDOC
Presenting Problems





- Arrival Data


Date of Arrival on Unit: 19


Time of Arrival on Unit: 22:25


Mode of Transport: Ambulatory





- Complaint


OB-Reason for Admission/Chief Complaint: Decreased Fetal Movement


Comment: no movement felt since 





Prenatal Medical History





- Pregnancy Information


: 1


Para: 0


Term: 0


: 0


Abortions: Spontaneous or Elective: 0


Number of Living Children: 0





- Gestational Age


Gestational Age by STEPHANY (wks/days): 39 Weeks and 5 Days





- Prenatal History


Pregnancy Complications: Smoker





Review of Systems





- Review of Systems


Constitutional: No problems


Breast: No problems


ENT: No problems


Cardiovascular: No problems


Respiratory: No problems


Gastrointestinal: No problems


Genitourinary: No problems


Musculoskeletal: No problems


Neurological: No problems


Skin: No problems





Vital Signs





- Temperature


Temperature: 96.8 F


Temperature Source: Temporal Artery Scan





- Pulse


  ** Right Brachial


Pulse Rate: 93


Pulse Assessment Method: Automatic Cuff





- Respirations


Respiratory Rate: 18


Oxygen Delivery Method: Room Air


O2 Sat by Pulse Oximetry: 100





- Blood Pressure


  ** Right Arm


Blood Pressure: 124/74


Blood Pressure Mean: 90


Blood Pressure Source: Automatic Cuff





Medical Screen Scoring (Pre)





- Cervical Exam


Dilation: 1-3 cm = 1


Effacement: More than 50% = 2


Membranes: Intact





- Uterine Contractions


Frequency: > or = 36 weeks =2


Duration: N/A


Intensity: N/A





- Maternal Vital Signs


Maternal Temperature: N/A


Maternal Blood Pressure: N/A


Signs of Preeclampsia: N/A


Maternal Respirations: N/A





- Maternal Trauma


Maternal Trauma: N/A





- Fetal Assessment - Baby A


Baseline FHR: 130


Fetal Heart Rate - NICHD Category: Category I (Normal) = 0


NST: Reactive


Fetal Position: N/A


Fetal Station: N/A





- Total Score - Baby A


Total Score - Baby A: 5





- Total Score - Baby B


Total Score - Baby B: 5





- Total Score - Baby C


Total Score - Baby C: 5





- Level of Risk - Baby A


Level of Risk - Baby A: Low (0-5)





- Level of Risk - Baby B


Level of Risk - Baby B: Low (0-5)





- Level of Risk - Baby C


Level of Risk - Baby C: Low (0-5)





Physician Notification (Pre)





- Physician Notified


Physician Notified Date: 19


Physician Notified Time: 23:26


Physician/Practitioner Notifed:: Dr Gillette


Spoke With: Dr. Starkey


New Order Received: Yes





- Notification Comment


Comment: discharge home, review kick count instructions with pt, increase oral 

fluid, follow up at scheduled appt on Tuesday





Disposition





- Disposition


OB Disposition: Triage, Discharge to home, Written follow up instructions 

reviewed


Discharge Date: 19


Discharge Time: 23:40


I agree with the RN Medical Screening Exam: Yes


Risk & Benefit of care provided described in d/c instruction: Yes


Diagnosis: DECREASED FETAL MOVEMENTS, THIRD TRIMESTER, UNSP

## 2019-08-07 ENCOUNTER — HOSPITAL ENCOUNTER (INPATIENT)
Dept: HOSPITAL 47 - FBPOP | Age: 24
LOS: 2 days | Discharge: HOME | End: 2019-08-09
Attending: OBSTETRICS & GYNECOLOGY | Admitting: OBSTETRICS & GYNECOLOGY
Payer: COMMERCIAL

## 2019-08-07 ENCOUNTER — HOSPITAL ENCOUNTER (OUTPATIENT)
Dept: HOSPITAL 47 - FBPOP | Age: 24
Discharge: HOME | End: 2019-08-07
Attending: OBSTETRICS & GYNECOLOGY
Payer: COMMERCIAL

## 2019-08-07 VITALS — BODY MASS INDEX: 33.9 KG/M2

## 2019-08-07 DIAGNOSIS — Z23: ICD-10-CM

## 2019-08-07 DIAGNOSIS — Z53.9: Primary | ICD-10-CM

## 2019-08-07 DIAGNOSIS — Z71.6: ICD-10-CM

## 2019-08-07 DIAGNOSIS — Z86.59: ICD-10-CM

## 2019-08-07 DIAGNOSIS — Z3A.40: ICD-10-CM

## 2019-08-07 DIAGNOSIS — F17.200: ICD-10-CM

## 2019-08-07 DIAGNOSIS — Z82.0: ICD-10-CM

## 2019-08-07 LAB
BASOPHILS # BLD AUTO: 0 K/UL (ref 0–0.2)
BASOPHILS NFR BLD AUTO: 0 %
EOSINOPHIL # BLD AUTO: 0.3 K/UL (ref 0–0.7)
EOSINOPHIL NFR BLD AUTO: 2 %
ERYTHROCYTE [DISTWIDTH] IN BLOOD BY AUTOMATED COUNT: 4.66 M/UL (ref 3.8–5.4)
ERYTHROCYTE [DISTWIDTH] IN BLOOD: 16.1 % (ref 11.5–15.5)
HCT VFR BLD AUTO: 39.9 % (ref 34–46)
HGB BLD-MCNC: 13 GM/DL (ref 11.4–16)
LYMPHOCYTES # SPEC AUTO: 2 K/UL (ref 1–4.8)
LYMPHOCYTES NFR SPEC AUTO: 12 %
MCH RBC QN AUTO: 27.9 PG (ref 25–35)
MCHC RBC AUTO-ENTMCNC: 32.5 G/DL (ref 31–37)
MCV RBC AUTO: 85.7 FL (ref 80–100)
MONOCYTES # BLD AUTO: 0.6 K/UL (ref 0–1)
MONOCYTES NFR BLD AUTO: 3 %
NEUTROPHILS # BLD AUTO: 14.4 K/UL (ref 1.3–7.7)
NEUTROPHILS NFR BLD AUTO: 82 %
PLATELET # BLD AUTO: 203 K/UL (ref 150–450)
WBC # BLD AUTO: 17.5 K/UL (ref 3.8–10.6)

## 2019-08-07 PROCEDURE — 86900 BLOOD TYPING SEROLOGIC ABO: CPT

## 2019-08-07 PROCEDURE — 86901 BLOOD TYPING SEROLOGIC RH(D): CPT

## 2019-08-07 PROCEDURE — 85025 COMPLETE CBC W/AUTO DIFF WBC: CPT

## 2019-08-07 PROCEDURE — 3E0134Z INTRODUCTION OF SERUM, TOXOID AND VACCINE INTO SUBCUTANEOUS TISSUE, PERCUTANEOUS APPROACH: ICD-10-PCS

## 2019-08-07 PROCEDURE — 86850 RBC ANTIBODY SCREEN: CPT

## 2019-08-07 RX ADMIN — IBUPROFEN PRN MG: 600 TABLET ORAL at 23:33

## 2019-08-07 NOTE — P.HPOB
History of Present Illness


H&P Date: 19


Chief Complaint: Intrauterine pregnancy at 40 weeks: Active labor





Patient is a 23-year-old  at 40 weeks 3 days' gestation arise in active 

labor.  She was here earlier this afternoon and dilated to 2 and half on 

returned this evening she is dilated to 4 to half and making cervical change.  

She is essentially 100% thinned out -1 station.  Artificial rupture membranes wa

s performed with light meconium noted.  Her pregnancy course according to 

patient has been unremarkable and she is feeling well at this time.  She voices 

no complaints other than strength in pain with contractions.  Pertinent labs 

include O+ blood type, Rh antibody was negative, rubella was immune, hepatitis B

surface antigen and RPR as well as HIV were all negative.  GBS was negative.  On

physical exam vital signs are stable and afebrile.  Heart regular, lungs clear, 

extremities without pain.  Abdomen soft nontender.  Gravid uterus noted.  

Category 2 tracing is noted.  There is good [id number] variability/moderate 

overall variability with some repetitive for appear to be late decelerations.  

We have informed artificial rupture membranes and we are giving a fluid bolus 

and we changed position to try and alleviate the category 2 tracing, however she

is making rapid change.


Assessment intrauterine pregnancy at term.  Plan expect spontaneous vaginal 

delivery.





Past Medical History


Past Medical History: No Reported History


History of Any Multi-Drug Resistant Organisms: None Reported


Past Surgical History: No Surgical Hx Reported


Past Psychological History: Depression


Additional Psychological History / Comment(s): scars from self cutting


Smoking Status: Current every day smoker


Past Alcohol Use History: None Reported


Past Drug Use History: None Reported





- Past Family History


  ** Sister(s)


Family Medical History: Seizure Disorder


Additional Family Medical History / Comment(s): strong family history of 

epilepsy but none for her





Medications and Allergies


                                Home Medications











 Medication  Instructions  Recorded  Confirmed  Type


 


No Known Home Medications  19 History








                                    Allergies











Allergy/AdvReac Type Severity Reaction Status Date / Time


 


No Known Allergies Allergy   Verified 19 10:04














Exam


Osteopathic Statement: *.  No significant issues noted on an osteopathic 

structural exam other than those noted in the History and Physical/Consult.


                                   Vital Signs











  Pulse Resp BP Pulse Ox


 


 19 20:52  89  18  117/74  98








                                Intake and Output











 19





 06:59 14:59 22:59


 


Other:   


 


  Weight   76.204 kg














Results


Result Diagrams: 


                                 19 21:00





                  Abnormal Lab Results - Last 24 Hours (Table)











  19 Range/Units





  21:00 


 


WBC  17.5 H  (3.8-10.6)  k/uL


 


RDW  16.1 H  (11.5-15.5)  %


 


Neutrophils #  14.4 H  (1.3-7.7)  k/uL

## 2019-08-07 NOTE — P.PROBDLV
Vaginal Delivery Note





- .


Vaginal Delivery Note: 





Patient progressed to complete and pushed with spontaneous vaginal delivery of a

viable male  over an intact perineum.  Patient did deliver precipitously 

over the following our from her artificial rupture membranes.  Once baby's head 

was delivered anterior posterior shoulders were easily delivered followed by the

remainder the baby.  Baby was then placed on mother's abdomen where bulb suction

mouth nares was performed and the umbilical cord was allowed to pulsate for 30 

seconds prior to clamping and cutting.  Once this was accomplished nursery 

personnel was present and assumed care.  Placenta was then delivered intact and 

Pitocin was added to the IV.  Apgar scores were 8 and 9 at one and 5 minutes 

respectively and the weight was 7 lbs. 4 oz.  Patient's bilateral periurethral 

avulsion's.  Neither of them are however bleeding and after discussion with the 

patient and her family the option to not have them repaired was made.  She is 

encouraged to use DuraPrep Plast breaks to relieve the burning when she urinates

otherwise she is doing well following the delivery.

## 2019-08-08 LAB
BASOPHILS # BLD AUTO: 0 K/UL (ref 0–0.2)
BASOPHILS NFR BLD AUTO: 0 %
EOSINOPHIL # BLD AUTO: 0.1 K/UL (ref 0–0.7)
EOSINOPHIL NFR BLD AUTO: 0 %
ERYTHROCYTE [DISTWIDTH] IN BLOOD BY AUTOMATED COUNT: 4.04 M/UL (ref 3.8–5.4)
ERYTHROCYTE [DISTWIDTH] IN BLOOD: 14.6 % (ref 11.5–15.5)
HCT VFR BLD AUTO: 34.1 % (ref 34–46)
HGB BLD-MCNC: 11.4 GM/DL (ref 11.4–16)
LYMPHOCYTES # SPEC AUTO: 2.5 K/UL (ref 1–4.8)
LYMPHOCYTES NFR SPEC AUTO: 12 %
MCH RBC QN AUTO: 28.2 PG (ref 25–35)
MCHC RBC AUTO-ENTMCNC: 33.4 G/DL (ref 31–37)
MCV RBC AUTO: 84.5 FL (ref 80–100)
MONOCYTES # BLD AUTO: 1.2 K/UL (ref 0–1)
MONOCYTES NFR BLD AUTO: 6 %
NEUTROPHILS # BLD AUTO: 16.9 K/UL (ref 1.3–7.7)
NEUTROPHILS NFR BLD AUTO: 81 %
PLATELET # BLD AUTO: 190 K/UL (ref 150–450)
WBC # BLD AUTO: 20.8 K/UL (ref 3.8–10.6)

## 2019-08-08 RX ADMIN — DOCUSATE SODIUM AND SENNOSIDES SCH: 50; 8.6 TABLET ORAL at 19:55

## 2019-08-08 RX ADMIN — IBUPROFEN PRN MG: 600 TABLET ORAL at 19:55

## 2019-08-08 RX ADMIN — DOCUSATE SODIUM AND SENNOSIDES SCH: 50; 8.6 TABLET ORAL at 12:04

## 2019-08-08 NOTE — P.PNOBGVD
Subjective





- Subjective


Principal diagnosis: Status post vaginal delivery postpartum day #1


Interval history: 





Patient is doing well.  Lochia is decreasing.  Pain is fairly well controlled.  

She is working on breast-feeding.


Patient reports: Reports appetite normal, Reports voiding normally, Reports pain

well controlled, Reports ambulating normally


Estelline: doing well, nursing well





Objective





- Latest Vital Signs


Latest vital signs: 


                                   Vital Signs











  Temp Pulse Resp BP Pulse Ox


 


 19 04:00  98.5 F  93  16  93/58  99


 


 19 00:20  97.2 F L  68  14  131/79 


 


 19 23:50   70  14  116/81 


 


 19 23:20   72  14  119/67 


 


 19 23:05   80  18  117/69 


 


 19 22:50    18  115/71 


 


 19 22:32   76  18  112/70 


 


 19 22:20   88  18  114/70 


 


 19 20:52   89  18  117/74  98








                                Intake and Output











 19





 22:59 06:59 14:59


 


Intake Total 1499.5 500 


 


Balance 1499.5 500 


 


Intake:   


 


    


 


  Intake, IV Titration 999.5 500 





  Amount   


 


    Lactated Ringers 1,000 ml 500  





    @ 125 mls/hr IV .Q8H ALANIS   





    Rx#:[id number]   


 


    Oxytocin 20 Units/1000 ml 499.5  





    Ns 1,000 ml @ Per   





    Protocol IV .Q0M ALANIS Rx#:   





    [id number]   


 


    Oxytocin 30 Units/500 ml  500 





    Ns 30 unit In Saline 1   





    500ml.bag @ 1 MILLIUNIT/   





    MIN 1 mls/hr IV .Q24H ALANIS   





    Rx#:[id number]   


 


Other:   


 


  # Voids  2 


 


  Weight 76.204 kg  














- Exam


Extremities: Present: normal.  Absent: tenderness, edema


Abdomen: Present: normal appearance, soft.  Absent: distention, tenderness


Uterus: Present: normal, firm.  Absent: tenderness





- Labs


Labs: 


                  Abnormal Lab Results - Last 24 Hours (Table)











  19 Range/Units





  21:00 05:52 


 


WBC  17.5 H  20.8 H  (3.8-10.6)  k/uL


 


RDW  16.1 H   (11.5-15.5)  %


 


Neutrophils #  14.4 H  16.9 H  (1.3-7.7)  k/uL


 


Monocytes #   1.2 H  (0-1.0)  k/uL














Assessment and Plan


Assessment: 





Status post vaginal delivery postpartum day #1


Plan: 





Continue with postpartum care today.  Anticipate discharge home tomorrow.

## 2019-08-09 VITALS — HEART RATE: 61 BPM | SYSTOLIC BLOOD PRESSURE: 123 MMHG | TEMPERATURE: 97.8 F | DIASTOLIC BLOOD PRESSURE: 71 MMHG

## 2019-08-09 VITALS — RESPIRATION RATE: 14 BRPM

## 2019-08-09 RX ADMIN — DOCUSATE SODIUM AND SENNOSIDES SCH: 50; 8.6 TABLET ORAL at 09:59

## 2019-08-09 RX ADMIN — IBUPROFEN PRN MG: 600 TABLET ORAL at 08:14

## 2019-08-09 NOTE — P.DS
Providers


Date of admission: 


19 20:23





Expected date of discharge: 19


Attending physician: 


Bita Starkey





Primary care physician: 


Bita Starkey





[facility] Course: 





This is a 23-year-old female  1 para 0 at 40-3/7 weeks who presented with

active labor.  She delivered vaginally a viable male infant with Apgar scores of

8 at 1 minute and 9 at 5 minutes and infant weight of 7 lbs. 4 oz.  Her 

postpartum course has been essentially uncomplicated.  Baby is on a bili light. 

Her lochia is decreasing.  Pain is fairly well controlled with ibuprofen.  Vital

signs are stable.  Abdomen is soft with fundus firm and nontender.  Extremities 

show negative Homans.  Impression is status post vaginal delivery postpartum day

#2.  Plan is to discharge home today.  Routine postpartum instructions are 

given.  She is advised follow-up in the office in 6 weeks for postpartum check. 

She is advised to call the office if she has any further questions or concerns 

prior to her appointment time.


Procedures: 





Spontaneous vaginal delivery of a viable male infant on 2019


Patient Condition at Discharge: Stable





Plan - Discharge Summary


Discharge Rx Participant: No


New Discharge Prescriptions: 


No Action


   No Known Home Medications 


Discharge Medication List





No Known Home Medications  19 [History]








Follow up Appointment(s)/Referral(s): 


Bita Starkey DO [Primary Care Provider] - 1 Week


Activity/Diet/Wound Care/Special Instructions: 


Postpartum Instructions





1.  Do not begin any exercise program for 3 weeks.


2.  Do not resume sexual relations for 3 weeks or longer if uncomfortable.


3.  You may take tub baths or showers at any time.


4.  You may use tampons if desired after 3 weeks.


5.  Keep the area of episiotomy (stitches) clean and dry.


6.  If you are not nursing, wear a good fitting, supportive bra during the day 

and limit fluid intake for at least 1 week to prevent breast engorgement.


7.  Call the office, [phone], within the next week to make appointment for your

6 week checkup if it has not already been made.


8.  Report any of the following occurrences to the doctor promptly:


     a.  Heavy, excessive bleeding


     b.  Chills, fever


     c.  Burning or frequency of urination


     d.  Pain or redness and breasts if nursing


     e.  Increasing pain or swelling in episiotomy (stitches).











In addition to the above instructions, the following additional should be 

followed:


1.  No heavy lifting or straining (exercising) until after 6 week checkup.


2.  Keep abdominal incision clean and dry: You may wear a dressing if more 

comfortable.


3.  Make office appointment for 10 days after going home or as instructed by her

doctor.


Discharge Disposition: HOME SELF-CARE